# Patient Record
Sex: FEMALE | Race: WHITE | Employment: FULL TIME | URBAN - METROPOLITAN AREA
[De-identification: names, ages, dates, MRNs, and addresses within clinical notes are randomized per-mention and may not be internally consistent; named-entity substitution may affect disease eponyms.]

---

## 2023-10-10 ENCOUNTER — APPOINTMENT (OUTPATIENT)
Dept: GENERAL RADIOLOGY | Age: 63
End: 2023-10-10
Payer: COMMERCIAL

## 2023-10-10 ENCOUNTER — HOSPITAL ENCOUNTER (EMERGENCY)
Age: 63
Discharge: HOME | End: 2023-10-10
Attending: EMERGENCY MEDICINE
Payer: COMMERCIAL

## 2023-10-10 VITALS
RESPIRATION RATE: 16 BRPM | TEMPERATURE: 98 F | HEART RATE: 60 BPM | WEIGHT: 160 LBS | SYSTOLIC BLOOD PRESSURE: 131 MMHG | DIASTOLIC BLOOD PRESSURE: 75 MMHG | OXYGEN SATURATION: 99 % | HEIGHT: 67 IN | BODY MASS INDEX: 25.11 KG/M2

## 2023-10-10 DIAGNOSIS — S82.841A CLOSED BIMALLEOLAR FRACTURE OF RIGHT ANKLE, INITIAL ENCOUNTER: Primary | ICD-10-CM

## 2023-10-10 PROCEDURE — 6370000000 HC RX 637 (ALT 250 FOR IP): Performed by: EMERGENCY MEDICINE

## 2023-10-10 PROCEDURE — 99284 EMERGENCY DEPT VISIT MOD MDM: CPT

## 2023-10-10 PROCEDURE — 6360000002 HC RX W HCPCS: Performed by: EMERGENCY MEDICINE

## 2023-10-10 PROCEDURE — 73610 X-RAY EXAM OF ANKLE: CPT

## 2023-10-10 PROCEDURE — 29515 APPLICATION SHORT LEG SPLINT: CPT

## 2023-10-10 PROCEDURE — 29125 APPL SHORT ARM SPLINT STATIC: CPT

## 2023-10-10 PROCEDURE — 73630 X-RAY EXAM OF FOOT: CPT

## 2023-10-10 PROCEDURE — 96372 THER/PROPH/DIAG INJ SC/IM: CPT

## 2023-10-10 RX ORDER — ACETAMINOPHEN 325 MG/1
650 TABLET ORAL ONCE
Status: COMPLETED | OUTPATIENT
Start: 2023-10-10 | End: 2023-10-10

## 2023-10-10 RX ORDER — POLYETHYLENE GLYCOL 3350 17 G/17G
17 POWDER, FOR SOLUTION ORAL DAILY
Qty: 510 G | Refills: 0 | Status: SHIPPED | OUTPATIENT
Start: 2023-10-10 | End: 2023-11-09

## 2023-10-10 RX ORDER — ACETAMINOPHEN 500 MG
1000 TABLET ORAL ONCE
Status: DISCONTINUED | OUTPATIENT
Start: 2023-10-10 | End: 2023-10-10

## 2023-10-10 RX ORDER — HYDROCODONE BITARTRATE AND ACETAMINOPHEN 5; 325 MG/1; MG/1
1 TABLET ORAL EVERY 6 HOURS PRN
Qty: 12 TABLET | Refills: 0 | Status: SHIPPED | OUTPATIENT
Start: 2023-10-10 | End: 2023-10-13

## 2023-10-10 RX ORDER — KETOROLAC TROMETHAMINE 30 MG/ML
15 INJECTION, SOLUTION INTRAMUSCULAR; INTRAVENOUS ONCE
Status: COMPLETED | OUTPATIENT
Start: 2023-10-10 | End: 2023-10-10

## 2023-10-10 RX ADMIN — ACETAMINOPHEN 650 MG: 325 TABLET ORAL at 22:03

## 2023-10-10 RX ADMIN — KETOROLAC TROMETHAMINE 15 MG: 30 INJECTION, SOLUTION INTRAMUSCULAR at 22:03

## 2023-10-10 ASSESSMENT — PAIN DESCRIPTION - LOCATION
LOCATION: ANKLE

## 2023-10-10 ASSESSMENT — PAIN DESCRIPTION - PAIN TYPE
TYPE: ACUTE PAIN
TYPE: ACUTE PAIN

## 2023-10-10 ASSESSMENT — PAIN - FUNCTIONAL ASSESSMENT: PAIN_FUNCTIONAL_ASSESSMENT: 0-10

## 2023-10-10 ASSESSMENT — LIFESTYLE VARIABLES
HOW MANY STANDARD DRINKS CONTAINING ALCOHOL DO YOU HAVE ON A TYPICAL DAY: 1 OR 2
HOW OFTEN DO YOU HAVE A DRINK CONTAINING ALCOHOL: 4 OR MORE TIMES A WEEK

## 2023-10-10 ASSESSMENT — PAIN DESCRIPTION - ORIENTATION
ORIENTATION: RIGHT
ORIENTATION: RIGHT

## 2023-10-10 ASSESSMENT — PAIN SCALES - GENERAL
PAINLEVEL_OUTOF10: 5
PAINLEVEL_OUTOF10: 8
PAINLEVEL_OUTOF10: 7

## 2023-10-10 ASSESSMENT — PAIN DESCRIPTION - DESCRIPTORS
DESCRIPTORS: SHARP
DESCRIPTORS: SHARP

## 2023-10-11 NOTE — DISCHARGE INSTRUCTIONS
Drink lots of water. Tylenol 1g (2 extra strength tabs) and 600mg Motrin (aka ibuprofen) every 8 hours for pain. You can substitute a Norco for Tylenol if needed for breakthrough pain. Ice 20 mins on and off as much as you can while awake     Start Miralax . .. Being immobile will make your bowels slow down and we do not want to add constipation as another problem for you! You need to make an appointment with orthopedic surgery (above) for follow up as this is can be surgical fracture. Follow up with your doctor in 2 days for reevaluation. Return for worsening or concerning symptoms.

## 2023-10-11 NOTE — ED NOTES
Patient discharge with instructions and prescriptions x2 w/ splint and crutches. Pt verbalized understanding.         Sean Oakley RN  10/10/23 [id number]

## 2023-10-11 NOTE — ED NOTES
Pt given blanket, no needs at this time call light within reach.         America Nguyen RN  10/10/23 [id number]

## 2023-10-17 NOTE — ED PROVIDER NOTES
CC: Ankle pain  Seen in room 2    HPI: This is a 59-year-old female who was rollerskating, lost her tolerance and fell twisting her ankle. She is not on blood thinners, denied any other injuries with the fall. She has not had any head strike, no loss of consciousness. Denies any numbness or tingling. She did not have any break in the skin. No other concerns. Past medical and surgical history: Bladder repair  Social: denied smoking, drinking, drugs  Allergies: reviewed    Physical exam:  General: awake, alert. No acute distress. Sitting in bed, speaking full sentences, provides own history. Skin: No rashes noted. HENT: NC/AT. EOMI, PERRL. Mucous membranes moist.   Neck: Trachea midline. Chest: Symmetrical rise and fall of the chest. Normal work of breathing. Cardio: Heart is regular rate and rhythm. Abdomen: Soft, nontender, nondistended. Back: Active ROM. Extremities: Pulses present. Left lower extremity grossly normal.  Right lower extremity has tenderness at the lateral and medial malleoli. She has good range of motion, however it is painful. Sensation is grossly intact. There is no skin break. Neuro: A/O x 3. No gross focal motor deficits noted. --------  Medical decision making    Differential diagnosis includes but not limited to: indirect and direct pathophysiologic etiologies vascular, inflammatory, infectious, neoplastic, degenerative, deficiency, drugs, idiopathic, intoxication, iatrogenic, congenital, autoimmune, allergic, anatomic, trauma, endocrine, environmental, and metabolic. ED course: Patient seen and evaluated by me after a rollerskating incident where she twisted and fell injuring her right ankle. Her exam reveals tenderness at the  medial and lateral malleoli on the right ankle. She has good range of motion, good sensation. There is some mild swelling. She is closed, neurovascularly intact.   Plain films are obtained and revealed nondisplaced fractures of the

## 2025-06-03 ENCOUNTER — OFFICE (OUTPATIENT)
Dept: URBAN - METROPOLITAN AREA CLINIC 18 | Age: 65
End: 2025-06-03
Payer: COMMERCIAL

## 2025-06-03 VITALS
WEIGHT: 165 LBS | HEART RATE: 77 BPM | SYSTOLIC BLOOD PRESSURE: 124 MMHG | DIASTOLIC BLOOD PRESSURE: 74 MMHG | HEIGHT: 66 IN

## 2025-06-03 DIAGNOSIS — R19.7 DIARRHEA, UNSPECIFIED: ICD-10-CM

## 2025-06-03 PROCEDURE — 99203 OFFICE O/P NEW LOW 30 MIN: CPT | Performed by: INTERNAL MEDICINE
